# Patient Record
(demographics unavailable — no encounter records)

---

## 2024-11-13 NOTE — PHYSICAL EXAM
[Appropriately responsive] : appropriately responsive [Alert] : alert [No Acute Distress] : no acute distress [Soft] : soft [Non-tender] : non-tender [Non-distended] : non-distended [No Lesions] : no lesions [No Mass] : no mass [Oriented x3] : oriented x3 [Labia Majora] : normal [Labia Minora] : normal [Normal] : normal [Enlarged ___ wks] : enlarged [unfilled] ~Uweeks [FreeTextEntry6] : bulky myomatous uterus approx 16 wk

## 2024-11-13 NOTE — PLAN
[FreeTextEntry1] : here for f/u fibroids and f/u med management of HRT she is on .075 patch and she is taking progesterone daily no bleeding myoma had grown compared to 2018 so here for short interval f/u today stable in size d/w  pt f/u for her annual in April, then may have her f/u at [facility] radiology for 1 yr comparison gyn sono  *checking for interval growth of fibroids today largest fibroid 6.8 cm in diameter which is smaller than last imaging but may be due to different imaging

## 2024-11-13 NOTE — PLAN
[FreeTextEntry1] : here for f/u fibroids and f/u med management of HRT she is on .075 patch and she is taking progesterone daily no bleeding myoma had grown compared to 2018 so here for short interval f/u today stable in size d/w  pt f/u for her annual in April, then may have her f/u at [facility] radiology for 1 yr comparison gyn sono  *checking for interval growth of fibroids today largest fibroid 6.8 cm in diameter which is smaller than last imaging but may be due to different imaging

## 2024-11-13 NOTE — PROCEDURE
[Transvaginal Ultrasound] : transvaginal ultrasound [Not Visualized] : not visualized [FreeTextEntry3] : also used trans abdominal sono *checking for interval growth of fibroids today largest fibroid 6.8 cm in diameter which is smaller than last imaging but may be due to different imaging [FreeTextEntry7] : not able to see due to large myomas [FreeTextEntry4] : stable large fibroids